# Patient Record
Sex: FEMALE | Race: WHITE | NOT HISPANIC OR LATINO | Employment: UNEMPLOYED | URBAN - METROPOLITAN AREA
[De-identification: names, ages, dates, MRNs, and addresses within clinical notes are randomized per-mention and may not be internally consistent; named-entity substitution may affect disease eponyms.]

---

## 2018-03-15 ENCOUNTER — HOSPITAL ENCOUNTER (EMERGENCY)
Facility: HOSPITAL | Age: 38
Discharge: HOME | End: 2018-03-15
Attending: EMERGENCY MEDICINE | Admitting: EMERGENCY MEDICINE

## 2018-03-15 VITALS
HEIGHT: 66 IN | HEART RATE: 77 BPM | SYSTOLIC BLOOD PRESSURE: 130 MMHG | DIASTOLIC BLOOD PRESSURE: 86 MMHG | WEIGHT: 131 LBS | TEMPERATURE: 98.6 F | OXYGEN SATURATION: 100 % | RESPIRATION RATE: 16 BRPM | BODY MASS INDEX: 21.05 KG/M2

## 2018-03-15 DIAGNOSIS — F17.200 TOBACCO DEPENDENCE: ICD-10-CM

## 2018-03-15 DIAGNOSIS — S61.451A CAT BITE OF RIGHT HAND, INITIAL ENCOUNTER: Primary | ICD-10-CM
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
## 2018-03-15 PROCEDURE — 99283 EMERGENCY DEPT VISIT LOW MDM: CPT

## 2018-03-15 RX ORDER — AMOXICILLIN AND CLAVULANATE POTASSIUM 875; 125 MG/1; MG/1
1 TABLET, FILM COATED ORAL EVERY 12 HOURS
Qty: 20 TABLET | Refills: 0 | Status: SHIPPED | OUTPATIENT
Start: 2018-03-15

## 2018-03-15 RX ORDER — AMOXICILLIN AND CLAVULANATE POTASSIUM 875; 125 MG/1; MG/1
1 TABLET, FILM COATED ORAL ONCE
Status: COMPLETED | OUTPATIENT
Start: 2018-03-15 | End: 2018-03-15

## 2018-03-15 RX ADMIN — AMOXICILLIN AND CLAVULANATE POTASSIUM 1 TABLET: 875; 125 TABLET, FILM COATED ORAL at 03:19

## 2018-03-15 NOTE — ED PROVIDER NOTES
Subjective   This is a 37-year-old  female that presents to the ER with a cat bite to her right hand that occurred 2 hours prior to arrival.  Patient says that she heard a cat fight ensuing right outside her door.  She went out to try to get her own pet cat from getting in a fight, and another stray cat bit her right hand.  Patient has been applying ice prior to arrival with minimal relief.  Patient is right-handed.  She has a small puncture to the thenar aspect of her right hand.  There is no significant soft tissue swelling or redness.  She denies any numbness or tingling.  She says that her tetanus shot is not up-to-date.  No other concerns at this time.        History provided by:  Patient  Animal Bite   Contact animal:  Cat  Time since incident:  2 hours  Pain details:     Quality:  Aching (throbbing)    Severity:  Mild    Timing:  Constant    Progression:  Unchanged  Incident location:  Outside  Provoked: Patient was trying to break up a cat fight between her pet cat and a stray that was just outside her house.    Notifications:  None (Pt will call the health department first thing in the morning.)  Animal's rabies vaccination status:  Unknown  Animal in possession: no    Tetanus status:  Out of date  Relieved by:  Nothing  Worsened by:  Nothing  Ineffective treatments:  Cold compresses  Associated symptoms: no fever, no numbness, no rash and no swelling        Review of Systems   Constitutional: Negative for chills and fever.   Musculoskeletal: Negative for arthralgias.   Skin: Positive for wound (cat bite to thenar palmar aspect of right hand.). Negative for color change and rash.   Neurological: Negative for numbness.       History reviewed. No pertinent past medical history.    No Known Allergies    History reviewed. No pertinent surgical history.    History reviewed. No pertinent family history.    Social History     Social History   • Marital status:      Social History Main Topics   •  Smoking status: Current Every Day Smoker     Types: Cigarettes   • Alcohol use No   • Drug use:      Types: Marijuana     Other Topics Concern   • Not on file           Objective   Physical Exam   Constitutional: She is oriented to person, place, and time. She appears well-developed and well-nourished. No distress.   HENT:   Head: Normocephalic and atraumatic.   Mouth/Throat: Oropharynx is clear and moist.   Eyes: Conjunctivae and EOM are normal. Pupils are equal, round, and reactive to light. No scleral icterus.   Neck: Normal range of motion. Neck supple.   Cardiovascular: Normal rate, regular rhythm and normal heart sounds.    Pulses:       Carotid pulses are 3+ on the right side.       Radial pulses are 3+ on the right side.   Pulmonary/Chest: Effort normal and breath sounds normal. No respiratory distress.   Abdominal: Soft. She exhibits no distension. There is no tenderness.   Musculoskeletal: Normal range of motion. She exhibits no edema.   Lymphadenopathy:     She has no cervical adenopathy.   Neurological: She is alert and oriented to person, place, and time.   Skin: Skin is warm and dry. She is not diaphoretic.        Psychiatric: She has a normal mood and affect. Her behavior is normal.   Nursing note and vitals reviewed.      Procedures         ED Course  ED Course   Comment By Time   Exam is stable.  We initiated Augmentin for cat bite to the right hand.  We recommended patient be updated on her tetanus shot.  She refused and says she just wants to go home. Anabel Tavera PA-C 03/15 0339          No results found for this or any previous visit (from the past 24 hour(s)).  Note: In addition to lab results from this visit, the labs listed above may include labs taken at another facility or during a different encounter within the last 24 hours. Please correlate lab times with ED admission and discharge times for further clarification of the services performed during this visit.    No orders to display  "    Vitals:    03/15/18 0205   BP: 130/86   BP Location: Left arm   Patient Position: Sitting   Pulse: 77   Resp: 16   Temp: 98.6 °F (37 °C)   TempSrc: Oral   SpO2: 100%   Weight: 59.4 kg (131 lb)   Height: 167.6 cm (66\")     Medications   amoxicillin-clavulanate (AUGMENTIN) 875-125 MG per tablet 1 tablet (1 tablet Oral Given 3/15/18 0319)     ECG/EMG Results (last 24 hours)     ** No results found for the last 24 hours. **                [facility]    Final diagnoses:   Cat bite of right hand, initial encounter   Tobacco dependence            Anabel Tavera PA-C  03/15/18 2248    "

## 2018-03-15 NOTE — DISCHARGE INSTRUCTIONS
Patient has evidence of an acute cat bite to her right hand.  She is going to report the stray cat bite to the health department in the morning.  Patient refused for tetanus shot to be updated.  We will prescribe Augmentin 875 mg twice daily for 10 days.  Recommend patient to establish care with primary care physician.  I will give her a list of accepting physicians in the local area.  Return if worsening symptoms of redness, warmth, or swelling.    Follow up with one of the [facility] physician groups below to setup primary care. If you have trouble following up, please call Angelita Merino, our transitional care nurse, at [phone].    (Dr. Rodriguez, Dr. Polanco, Dr. Luke, and Dr. Church.)  [facility], Primary Care, [phone], [address], [place] [address]    [facility], Primary Care, [phone], [address] [facility], [address], Primary Care, [phone], [address] [facility] [facility], [address] [place], [address] [facility], Primary Care, [phone], [address], [address] [place], [address]     [place] [address] [facility], Primary Care, [phone], [address], [address] [place], [address]    [place] [address] [facility], Primary Care, [phone], [address]    [place], [address] [facility], Primary Care, [phone], [address] [facility], [address] [place], [address] [facility], Primary Care, [phone], [address], [address] [place], [address] [facility], Primary Care, [phone], [address] [facility] [address] [facility], [address] [place], [address] [facility], Primary Care, [phone], [address], [address] [place], [id number] [place] [address]     ([facility]) [facility], Primary Care, [phone], [address], [place], [address] [place] [address], Primary Care, [phone], [address] [facility], [address] [place] [address]

## 2018-08-02 ENCOUNTER — HOSPITAL ENCOUNTER (EMERGENCY)
Facility: HOSPITAL | Age: 38
Discharge: HOME | End: 2018-08-02
Attending: EMERGENCY MEDICINE

## 2018-08-02 VITALS
HEIGHT: 66 IN | TEMPERATURE: 98.9 F | HEART RATE: 90 BPM | RESPIRATION RATE: 16 BRPM | BODY MASS INDEX: 19.29 KG/M2 | DIASTOLIC BLOOD PRESSURE: 77 MMHG | SYSTOLIC BLOOD PRESSURE: 120 MMHG | WEIGHT: 120 LBS | OXYGEN SATURATION: 98 %

## 2018-08-02 PROCEDURE — 99211 OFF/OP EST MAY X REQ PHY/QHP: CPT

## 2018-08-03 NOTE — ED PROVIDER NOTES
"Subjective     History provided by:  Patient  Abscess   Location:  Leg  Leg abscess location:  R lower leg  Abscess quality: draining, painful and redness    Duration:  3 days  Progression:  Worsening  Chronicity:  New  Context comment:  Recent MRSA infection secondary to tattoo  Relieved by:  Nothing  Worsened by:  Nothing  Ineffective treatments:  Oral antibiotics (abx 3 weeks ago post ED visit in [place])  Associated symptoms: fever and nausea        Review of Systems   Constitutional: Positive for fever.   Gastrointestinal: Positive for nausea.   Musculoskeletal: Positive for myalgias (generalized).   Skin: Positive for wound (lesions at tattoo site on RLE).   All other systems reviewed and are negative.      No past medical history on file.    No Known Allergies    No past surgical history on file.    No family history on file.    Social History     Social History   • Marital status:      Social History Main Topics   • Smoking status: Current Every Day Smoker     Packs/day: 1.00     Types: Cigarettes   • Smokeless tobacco: Never Used   • Alcohol use No   • Drug use: Yes     Types: Marijuana     Other Topics Concern   • Not on file         Objective   Physical Exam    Procedures         ED Course     No results found for this or any previous visit (from the past 24 hour(s)).  Note: In addition to lab results from this visit, the labs listed above may include labs taken at another facility or during a different encounter within the last 24 hours. Please correlate lab times with ED admission and discharge times for further clarification of the services performed during this visit.    No orders to display     Vitals:    08/02/18 2035   BP: 120/77   BP Location: Left arm   Patient Position: Sitting   Pulse: 90   Resp: 16   Temp: 98.9 °F (37.2 °C)   TempSrc: Oral   SpO2: 98%   Weight: 54.4 kg (120 lb)   Height: 167.6 cm (66\")     Medications - No data to display  ECG/EMG Results (last 24 hours)     ** No " results found for the last 24 hours. **                        [facility]    Final diagnoses:   None       Documentation assistance provided by stefani Frausto.  Information recorded by the scribe was done at my direction and has been verified and validated by me.     Tahir Frausto  08/02/18 4714

## 2018-08-03 NOTE — ED NOTES
Pt name called in lobby and looked in registration but could not be found by charge Mono Scott  08/02/18 4144